# Patient Record
Sex: MALE | URBAN - METROPOLITAN AREA
[De-identification: names, ages, dates, MRNs, and addresses within clinical notes are randomized per-mention and may not be internally consistent; named-entity substitution may affect disease eponyms.]

---

## 2023-05-09 ENCOUNTER — INPATIENT (INPATIENT)
Facility: HOSPITAL | Age: 48
LOS: 0 days | Discharge: HOME | DRG: 55 | End: 2023-05-10
Attending: SURGERY | Admitting: SURGERY
Payer: MEDICAID

## 2023-05-09 VITALS
OXYGEN SATURATION: 97 % | HEART RATE: 70 BPM | SYSTOLIC BLOOD PRESSURE: 134 MMHG | DIASTOLIC BLOOD PRESSURE: 87 MMHG | RESPIRATION RATE: 15 BRPM

## 2023-05-09 DIAGNOSIS — S09.90XA UNSPECIFIED INJURY OF HEAD, INITIAL ENCOUNTER: ICD-10-CM

## 2023-05-09 LAB
ABO RH CONFIRMATION: SIGNIFICANT CHANGE UP
ALBUMIN SERPL ELPH-MCNC: 4.2 G/DL — SIGNIFICANT CHANGE UP (ref 3.3–5)
ALP SERPL-CCNC: 106 U/L — SIGNIFICANT CHANGE UP (ref 40–120)
ALT FLD-CCNC: 26 U/L — SIGNIFICANT CHANGE UP (ref 12–78)
ANION GAP SERPL CALC-SCNC: 10 MMOL/L — SIGNIFICANT CHANGE UP (ref 5–17)
ANISOCYTOSIS BLD QL: SLIGHT — SIGNIFICANT CHANGE UP
APTT BLD: 27.7 SEC — SIGNIFICANT CHANGE UP (ref 27.5–35.5)
AST SERPL-CCNC: 26 U/L — SIGNIFICANT CHANGE UP (ref 15–37)
BASOPHILS # BLD AUTO: 0.11 K/UL — SIGNIFICANT CHANGE UP (ref 0–0.2)
BASOPHILS NFR BLD AUTO: 0.9 % — SIGNIFICANT CHANGE UP (ref 0–2)
BILIRUB SERPL-MCNC: 0.5 MG/DL — SIGNIFICANT CHANGE UP (ref 0.2–1.2)
BLD GP AB SCN SERPL QL: SIGNIFICANT CHANGE UP
BUN SERPL-MCNC: 16 MG/DL — SIGNIFICANT CHANGE UP (ref 7–23)
CALCIUM SERPL-MCNC: 8.9 MG/DL — SIGNIFICANT CHANGE UP (ref 8.5–10.1)
CHLORIDE SERPL-SCNC: 111 MMOL/L — HIGH (ref 96–108)
CO2 SERPL-SCNC: 20 MMOL/L — LOW (ref 22–31)
CREAT SERPL-MCNC: 1.36 MG/DL — HIGH (ref 0.5–1.3)
EGFR: 65 ML/MIN/1.73M2 — SIGNIFICANT CHANGE UP
EOSINOPHIL # BLD AUTO: 0.18 K/UL — SIGNIFICANT CHANGE UP (ref 0–0.5)
EOSINOPHIL NFR BLD AUTO: 1.4 % — SIGNIFICANT CHANGE UP (ref 0–6)
ETHANOL SERPL-MCNC: <10 MG/DL — SIGNIFICANT CHANGE UP (ref 0–10)
GLUCOSE SERPL-MCNC: 172 MG/DL — HIGH (ref 70–99)
HCT VFR BLD CALC: 40.8 % — SIGNIFICANT CHANGE UP (ref 39–50)
HGB BLD-MCNC: 12.9 G/DL — LOW (ref 13–17)
IMM GRANULOCYTES NFR BLD AUTO: 1.8 % — HIGH (ref 0–0.9)
INR BLD: 1.08 RATIO — SIGNIFICANT CHANGE UP (ref 0.88–1.16)
LACTATE SERPL-SCNC: 1.3 MMOL/L — SIGNIFICANT CHANGE UP (ref 0.7–2)
LYMPHOCYTES # BLD AUTO: 2.94 K/UL — SIGNIFICANT CHANGE UP (ref 1–3.3)
LYMPHOCYTES # BLD AUTO: 22.7 % — SIGNIFICANT CHANGE UP (ref 13–44)
MANUAL SMEAR VERIFICATION: SIGNIFICANT CHANGE UP
MCHC RBC-ENTMCNC: 21.5 PG — LOW (ref 27–34)
MCHC RBC-ENTMCNC: 31.6 GM/DL — LOW (ref 32–36)
MCV RBC AUTO: 68 FL — LOW (ref 80–100)
MICROCYTES BLD QL: SLIGHT — SIGNIFICANT CHANGE UP
MONOCYTES # BLD AUTO: 1 K/UL — HIGH (ref 0–0.9)
MONOCYTES NFR BLD AUTO: 7.7 % — SIGNIFICANT CHANGE UP (ref 2–14)
NEUTROPHILS # BLD AUTO: 8.47 K/UL — HIGH (ref 1.8–7.4)
NEUTROPHILS NFR BLD AUTO: 65.5 % — SIGNIFICANT CHANGE UP (ref 43–77)
PLAT MORPH BLD: NORMAL — SIGNIFICANT CHANGE UP
PLATELET # BLD AUTO: 276 K/UL — SIGNIFICANT CHANGE UP (ref 150–400)
PLATELET COUNT - ESTIMATE: NORMAL — SIGNIFICANT CHANGE UP
POTASSIUM SERPL-MCNC: 4.1 MMOL/L — SIGNIFICANT CHANGE UP (ref 3.5–5.3)
POTASSIUM SERPL-SCNC: 4.1 MMOL/L — SIGNIFICANT CHANGE UP (ref 3.5–5.3)
PROT SERPL-MCNC: 7.6 GM/DL — SIGNIFICANT CHANGE UP (ref 6–8.3)
PROTHROM AB SERPL-ACNC: 12.5 SEC — SIGNIFICANT CHANGE UP (ref 10.5–13.4)
RBC # BLD: 6 M/UL — HIGH (ref 4.2–5.8)
RBC # FLD: 15.9 % — HIGH (ref 10.3–14.5)
RBC BLD AUTO: ABNORMAL
SODIUM SERPL-SCNC: 141 MMOL/L — SIGNIFICANT CHANGE UP (ref 135–145)
TROPONIN I, HIGH SENSITIVITY RESULT: 16.54 NG/L — SIGNIFICANT CHANGE UP
TROPONIN I, HIGH SENSITIVITY RESULT: 5.55 NG/L — SIGNIFICANT CHANGE UP
WBC # BLD: 12.93 K/UL — HIGH (ref 3.8–10.5)
WBC # FLD AUTO: 12.93 K/UL — HIGH (ref 3.8–10.5)

## 2023-05-09 PROCEDURE — 84100 ASSAY OF PHOSPHORUS: CPT

## 2023-05-09 PROCEDURE — 72170 X-RAY EXAM OF PELVIS: CPT | Mod: 26

## 2023-05-09 PROCEDURE — 84484 ASSAY OF TROPONIN QUANT: CPT

## 2023-05-09 PROCEDURE — 85730 THROMBOPLASTIN TIME PARTIAL: CPT

## 2023-05-09 PROCEDURE — 70496 CT ANGIOGRAPHY HEAD: CPT | Mod: 26,MA

## 2023-05-09 PROCEDURE — 99222 1ST HOSP IP/OBS MODERATE 55: CPT

## 2023-05-09 PROCEDURE — 85025 COMPLETE CBC W/AUTO DIFF WBC: CPT

## 2023-05-09 PROCEDURE — 93005 ELECTROCARDIOGRAM TRACING: CPT

## 2023-05-09 PROCEDURE — 74177 CT ABD & PELVIS W/CONTRAST: CPT | Mod: 26,MA

## 2023-05-09 PROCEDURE — 70450 CT HEAD/BRAIN W/O DYE: CPT

## 2023-05-09 PROCEDURE — 72125 CT NECK SPINE W/O DYE: CPT | Mod: 26,MA

## 2023-05-09 PROCEDURE — 36415 COLL VENOUS BLD VENIPUNCTURE: CPT

## 2023-05-09 PROCEDURE — 85610 PROTHROMBIN TIME: CPT

## 2023-05-09 PROCEDURE — 71045 X-RAY EXAM CHEST 1 VIEW: CPT | Mod: 26

## 2023-05-09 PROCEDURE — 83735 ASSAY OF MAGNESIUM: CPT

## 2023-05-09 PROCEDURE — 83605 ASSAY OF LACTIC ACID: CPT

## 2023-05-09 PROCEDURE — 80048 BASIC METABOLIC PNL TOTAL CA: CPT

## 2023-05-09 PROCEDURE — 71260 CT THORAX DX C+: CPT | Mod: 26,MA

## 2023-05-09 PROCEDURE — 93010 ELECTROCARDIOGRAM REPORT: CPT

## 2023-05-09 PROCEDURE — 81001 URINALYSIS AUTO W/SCOPE: CPT

## 2023-05-09 PROCEDURE — 99285 EMERGENCY DEPT VISIT HI MDM: CPT

## 2023-05-09 PROCEDURE — 70498 CT ANGIOGRAPHY NECK: CPT | Mod: 26,MA

## 2023-05-09 PROCEDURE — 95816 EEG AWAKE AND DROWSY: CPT

## 2023-05-09 RX ORDER — MORPHINE SULFATE 50 MG/1
2 CAPSULE, EXTENDED RELEASE ORAL EVERY 4 HOURS
Refills: 0 | Status: DISCONTINUED | OUTPATIENT
Start: 2023-05-09 | End: 2023-05-10

## 2023-05-09 RX ORDER — SODIUM CHLORIDE 9 MG/ML
250 INJECTION INTRAMUSCULAR; INTRAVENOUS; SUBCUTANEOUS ONCE
Refills: 0 | Status: COMPLETED | OUTPATIENT
Start: 2023-05-09 | End: 2023-05-09

## 2023-05-09 RX ORDER — SODIUM CHLORIDE 9 MG/ML
1000 INJECTION INTRAMUSCULAR; INTRAVENOUS; SUBCUTANEOUS
Refills: 0 | Status: DISCONTINUED | OUTPATIENT
Start: 2023-05-09 | End: 2023-05-10

## 2023-05-09 RX ORDER — ONDANSETRON 8 MG/1
4 TABLET, FILM COATED ORAL EVERY 6 HOURS
Refills: 0 | Status: DISCONTINUED | OUTPATIENT
Start: 2023-05-09 | End: 2023-05-10

## 2023-05-09 RX ORDER — HYDROMORPHONE HYDROCHLORIDE 2 MG/ML
0.5 INJECTION INTRAMUSCULAR; INTRAVENOUS; SUBCUTANEOUS EVERY 4 HOURS
Refills: 0 | Status: DISCONTINUED | OUTPATIENT
Start: 2023-05-09 | End: 2023-05-10

## 2023-05-09 RX ADMIN — MORPHINE SULFATE 2 MILLIGRAM(S): 50 CAPSULE, EXTENDED RELEASE ORAL at 22:38

## 2023-05-09 RX ADMIN — SODIUM CHLORIDE 250 MILLILITER(S): 9 INJECTION INTRAMUSCULAR; INTRAVENOUS; SUBCUTANEOUS at 22:31

## 2023-05-09 RX ADMIN — ONDANSETRON 4 MILLIGRAM(S): 8 TABLET, FILM COATED ORAL at 22:38

## 2023-05-09 NOTE — ED ADULT NURSE NOTE - OBJECTIVE STATEMENT
pt presents to ed as code trauma after being found down outside of work unresponsive. pt having left side weakness. bp normotensive, pt protecting airway, gcs 12-13, 2 PIV placed and taken to ct

## 2023-05-09 NOTE — H&P ADULT - HISTORY OF PRESENT ILLNESS
Trauma Activation: 18:37    HPI: 47 year old male with unknown PMH  found down by his work truck with a abrasion to the left parietal area with a subgaleal hematoma. Pt arousable to voice states name, states he is in the hospital. Initially on arrival he did not move his left side. After CT he was moving all extremities R>L.       Primary Survey  A - airway intact  B - bilateral breath sounds and good chest rise  C - initially BP: 134/87 (05-09-23 @ 19:09), palpable pulses in all extremities  D - GCS 13 on arrival  Exposure obtained      Secondary survey  General: Well developed, in no acute distress.   HEENT: NC/AT, PERRLA EOMI  Chest: Lungs clear, no rales, no rhonchi, no wheezes.   Heart: RR, no murmurs, no rubs, no gallops.   Abdomen: Soft, no tenderness, no masses, BS normal.    Back: Normal curvature, no tenderness.   Neuro: Physiological, no localizing findings.   Skin: Normal, Abrasion noted over left scapula  Extremities: Warm, well perfused, no edema, Distal Pulses intact; 5/5 muscle strength on RUE/RLE; 4/5 LLE; 1/5 LUE      PMH:   Denies    PSH:  Denies    MEDS:   Denies    Allergies  No Known Allergies    Social  Unknown    Labs:                        12.9   12.93 )-----------( 276      ( 09 May 2023 18:42 )             40.8     05-09    141  |  111<H>  |  16  ----------------------------<  172<H>  4.1   |  20<L>  |  1.36<H>    Ca    8.9      09 May 2023 18:42    TPro  7.6  /  Alb  4.2  /  TBili  0.5  /  DBili  x   /  AST  26  /  ALT  26  /  AlkPhos  106  05-09    PT/INR - ( 09 May 2023 18:42 )   PT: 12.5 sec;   INR: 1.08 ratio         PTT - ( 09 May 2023 18:42 )  PTT:27.7 sec          Imaging:    < from: CT Abdomen and Pelvis w/ IV Cont (05.09.23 @ 19:13) >    ACC: [id number] EXAM:  CT ABDOMEN AND PELVIS IC   ORDERED BY: MORAIMA ESPINAL     ACC: [id number] EXAM:  CT CHEST IC   ORDERED BY: MORAIMA KYLIE     PROCEDURE DATE:  05/09/2023          INTERPRETATION:  CLINICAL INFORMATION: Found unresponsive. Fall    COMPARISON: None.    CONTRAST/COMPLICATIONS:  IV Contrast: Omnipaque 350   90 cc administered   10 cc discarded  Oral Contrast: NONE  Complications: None reported at time of study completion    PROCEDURE:  CT of the Chest, Abdomen and Pelvis was performed.  Imaging was performed through the chest in the arterial phase followed by   imaging of the abdomen and pelvis in the portal venous phase.  Sagittal and coronal reformats were performed.    FINDINGS:  CHEST:  LUNGS AND LARGE AIRWAYS: Patent central airways. 3 mm subpleural nodule   in the right middle lobe on image 55 of series 2 and additional tiny   nodule anteriorly on image 45 in the right upper lobe with that of tiny   nodule on image 31 in the right upper lobe  PLEURA: No pleural effusion.  VESSELS: Minimal atherosclerotic calcification of the aorta and coronary   arteries  HEART: Heart size is normal. No pericardial effusion.  MEDIASTINUM AND ELAYNE: No lymphadenopathy.  CHEST WALL AND LOWER NECK: Within normal limits.    ABDOMEN AND PELVIS:  LIVER: Within normal limits.  BILE DUCTS: Normal caliber.  GALLBLADDER: Within normal limits.  SPLEEN: Within normal limits.  PANCREAS: Within normal limits.  ADRENALS: Within normal limits.  KIDNEYS/URETERS: Within normal limits.    BLADDER: Within normal limits.  REPRODUCTIVE ORGANS: Prostate within normal limits.    BOWEL: No bowel obstruction. Appendix is not visualized. No evidence of   inflammation in the pericecal region.  PERITONEUM: No ascites.  VESSELS: Atherosclerotic changes.  RETROPERITONEUM/LYMPH NODES: No lymphadenopathy.  ABDOMINAL WALL: Within normal limits.  BONES: Within normal limits.    IMPRESSION: No evidence of acute traumatic injury  Atherosclerotic vascular disease more than expected for the stated age    Tiny right upper lobe pulmonary nodules are nonspecific and may be   followed with CT chest in 12 months in a high risk patient    --- End of Report ---

## 2023-05-09 NOTE — ED PROVIDER NOTE - NS_ ATTENDINGSCRIBEDETAILS _ED_A_ED_FT
I Luiz Shaver MD saw and examined the patient. Scribe documented for me and under my supervision. I have modified the scribe's documentation where necessary to reflect my history, physical exam and other relevant documentations pertinent to the care of the patient.

## 2023-05-09 NOTE — PHARMACOTHERAPY INTERVENTION NOTE - COMMENTS
Medication reconciliation completed.  Reviewed Medication list and confirmed med allergies with patient; confirmed with Dr. First Medbhavana.

## 2023-05-09 NOTE — ED PROVIDER NOTE - NEUROLOGICAL, MLM
AAOx0, no focal deficits, no motor or sensory deficits other than as per EMS left sided chronic findings. CNs 2-12 grossly seem intact. No nuchal rigidity.

## 2023-05-09 NOTE — ED PROVIDER NOTE - PRINCIPAL DIAGNOSIS
Certification for Inpatient


Patient admitted to: Observation


With expected LOS: <2 Midnights


Patient will require the following post-hospital care: None


Practitioner: I am a practitioner with admitting privileges, knowledge of 

patient current condition, hospital course, and medical plan of care.


Services: Services provided to patient in accordance with Admission requirements

found in Title 42 Section 412.3 of the Code of Federal Regulations





<Manas Rodriguez - Last Filed: 12/01/20 01:36>





Patient History


Date of Service: 12/01/20


Primary Care Provider: Out of town


Reason for admission: Diplopia


History of Present Illness: 





70-year-old  male with history of hyperlipidemia, hypertension, chronic

sinus issues presents emergency department for diplopia.  Patient reports that 

on Sunday he is driving from [place] with his daughter when he began to see 

double.  Patient reports that he applied an eye patch to his left eye and that 

this did resolve the issue.  Patient reports that if he independently covered 

either his eyes his vision was clear but when looking to both eyes at the same 

time  he did have diplopia.  Patient reports this has continued noticing a few 

times throughout the day yesterday that he is having diplopia.  Patient was not 

having any associated symptoms including headache, unilateral weakness, slurred 

speech, difficulty thinking.  Patient reports a similar episode approximately 3 

years ago when he was worked up for TIA/CVA, patient was given prism lenses and 

this resolved his diplopia at that time.  Patient was worked up in the emergency

department, labs remarkable for potassium 3.4, GFR 59, creatinine 1.23, BUN 20, 

glucose 170 CT head in the emergency department was negative for any acute 

findings.  Patient reports in 2017 he had an MRI which showed a pituitary growth

but no intervention was recommended at that time.  ED provider discuss case with

neurology who recommends observation for further evaluation and management.





When I saw the patient in the ER he was awake, alert, oriented x3.  Patient with

no focal neurological deficits noted.  Patient without diplopia on exam.  Given 

aspirin, folic acid, statin, MRI ordered for morning.   





- Past Medical/Surgical History


Diabetic: No


-: Hypertension


-: Hyperlipidemia


-: Chronic sinus issues


-: Hernia repair x2


-: Right knee surgery 


-: Multiple sinus surgeries


-: Cataract surgery


Psychosocial/ Personal History: Patient is a 





- Family History


Family History: Reviewed- Non-Contributory





- Social History


Smoking Status: Never smoker


Alcohol use: Yes


CD- Drugs: No


Caffeine use: Yes


Place of Residence: Home





<Manas Rodriguez - Last Filed: 12/01/20 01:36>


Date of Service: 12/01/20





<bri altman - Last Filed: 12/01/20 10:54>


Allergies





cefixime [From Suprax] Allergy (Verified 12/01/20 01:50)


   Itching/Hives/Rash








Review of Systems


10-point ROS is otherwise unremarkable


Neurological: Other (Diplopia), As per HPI





<MarilynManas nagy - Last Filed: 12/01/20 01:36>





Physical Examination





- Physical Exam


General: Alert, In no apparent distress


HEENT: Atraumatic, PERRLA, Mucous membr. moist/pink, EOMI, Sclerae nonicteric


Neck: Supple, 2+ carotid pulse no bruit, No LAD, Without JVD or thyroid 

abnormality


Respiratory: Clear to auscultation bilaterally, Normal air movement


Cardiovascular: Regular rate/rhythm, Normal S1 S2


Gastrointestinal: Normal bowel sounds, No tenderness


Musculoskeletal: No tenderness


Integumentary: No rashes


Neurological: Normal gait, Normal speech, Normal strength at 5/5 x4 extr, Normal

tone, Sensation intact, Cranial nerves 3-12 intact, Normal affect


Lymphatics: No axilla or inguinal lymphadenopathy





- Studies


Laboratory Data (last 24 hrs)





11/30/20 23:24: Sodium 144, Potassium 3.4 L, BUN 20 H, Creatinine 1.23, Glucose 

170 H, Total Bilirubin 0.4, AST 19, ALT 35, Alkaline Phosphatase 66


11/30/20 23:24: WBC 8.1, Hgb 14.3, Hct 41.8, Plt Count 205








<JenniferManas - Last Filed: 12/01/20 01:36>





- Studies


Laboratory Data (last 24 hrs)





11/30/20 23:24: Sodium 144, Potassium 3.4 L, BUN 20 H, Creatinine 1.23, Glucose 

170 H, Total Bilirubin 0.4, AST 19, ALT 35, Alkaline Phosphatase 66


11/30/20 23:24: WBC 8.1, Hgb 14.3, Hct 41.8, Plt Count 205








<bri altman - Last Filed: 12/01/20 10:54>





Assessment and Plan





- Plan


Assessment


Diplopia


Hypertension


Hyperlipidemia


Hyperglycemia





Plan


Diplopia:  Resolved at this time.  Neurology consult in place.  Continue with 

aspirin, folic acid, high intensity statin.  MRI ordered for morning.  Lipid 

panel also ordered for morning labs.


Hypertension:  Obtain and continue home medications.  Blood pressure under 

control this time.


Hyperlipidemia:  Continue with high intensity statin.


Hyperglycemia:  Blood sugar 170 in ED patient without history of diabetes.  

Obtain A1c with morning labs.


Discharge Plan: Home


Plan to discharge in: 24 Hours





- Advance Directives


Does patient have a Living Will: No


Does patient have a Durable POA for Healthcare: No





- Code Status/Comfort Care


Code Status Assessed: Yes (Full code)


Critical Care: No


Time Spent Managing Pts Care (In Minutes): 55





<Manas Rodriguez - Last Filed: 12/01/20 01:36>


Physician Review: Patient Assessed, Agree with Above Assessment and Plan


Physician Review Additional Text: 


Diplopia.  Patient relates his diplopia to history of strabismus which was 

corrected using prism lense.  He was not using the prism lense after his 

cataract surgery.





Plan:


MRI of the brain.


Aspirin.


Lipid profile shows hypertriglyceridemia.


Statin.


Low-fat diet recommended.











<bri altman - Last Filed: 12/01/20 10:54>
Closed head injury

## 2023-05-09 NOTE — ED PROVIDER NOTE - CLINICAL SUMMARY MEDICAL DECISION MAKING FREE TEXT BOX
closed head injury, unclear circumstances, r/o ICH, CVA, CT findings noted, spoke with trauma, trauma to admit. No tPA indicated as CT contraindication.

## 2023-05-09 NOTE — ED PROVIDER NOTE - OBJECTIVE STATEMENT
47 year old male presents to the ED BIBA found unresponsive face down outside work vehicle, fall from standing height, pt confused now. Per EMS, stated possible weakness to left side. 47 year old male presents to the ED BIBA found unresponsive face down outside work vehicle, fall from standing height, pt confused now. Per EMS, stated possible weakness to left side. Code stroke and Code trauma was called upon presentation. Hx limited at the time of presentation.

## 2023-05-09 NOTE — H&P ADULT - NSHPLABSRESULTS_GEN_ALL_CORE
CT HEAD:    Acute multifocal subarachnoid bleed identified in the left sylvian   fissure extending into the left frontal sulci, inferior interhemispheric   fissure, and right parietal sulci. No significant local mass effect.    There is a vascular stent identified in the distal cervical right ICA   extending into the vertical and horizontal petrous segments and second   intracranial right ICA stent in the clinoid/supraclinoid segment   extending into the ICA terminus.    Right temporal encephalomalacia and gliosis involving the insula and   operculum, as well as encephalomalacia in the right frontoparietal   regions to a lesser extent due to chronic right middle cerebral artery   (MCA) territory infarction. Suggestion of trace hyperattenuation along   the lateral margin of the infarction bed reflect additional site of   bleeding (9-32). Ex vacuo dilatation of the right lateral ventricle with   associated ipsilateral rightward shift of the septum pellucidum measuring   5 mm. No hydrocephalus.    Moderate left and mild right maxillary sinus mucosal thickening with near   complete opacification of the ethmoid sinuses bilaterally. Left frontal   sinus polyp or retention cyst. The mastoid air cells are clear.   Pneumatized petrous apices bilaterally. Calvarium is intact.    CTA BRAIN    INTERNAL CAROTID ARTERIES: Patent clinoid/supraclinoid ICA terminus stent   with contrast flow within and distal to the stent extending into the   right MCA. Additional device to the right of the right ICA terminus stent   is noted, possibly related to coil embolization.  Scattered calcified plaque along the left carotid siphon. No   flow-limiting stenosis or occlusion.    ANTERIOR CEREBRAL ARTERIES: Patent without flow-limiting stenosis or   occlusion. Anterior communicating artery is unremarkable without aneurysm.    MIDDLE CEREBRAL ARTERIES: Patent without flow-limiting stenosis or   occlusion. MCA bifurcations are unremarkable without aneurysm.    POSTERIOR CEREBRAL ARTERIES: Patent without flow-limiting stenosis or   occlusion. The left posterior communicating artery is diminutive, but   patent. There is a probable infundibulum at the origin of the right   posterior communicating artery. The right posterior communicating artery   is not well seen.    VERTEBROBASILAR SYSTEM: Patent without flow-limiting stenosis or   occlusion.    CTA NECK    RIGHT CAROTID SYSTEM: Patentthree tandem ICA stents extending from the   ICA origin to the level of the horizontal petrous segment via a tortuous   hairpin turn in the distal cervical ICA. Normal contrast opacification   within and distal to the stent intracranially. Patent right CCA.    LEFT CAROTID SYSTEM: Normal in caliber without flow-limiting stenosis or   occlusion. Tortuous distal cervical ICA segment with hairpin loop.    VERTEBRAL SYSTEM:  Normal in course and caliber without flow-limiting   stenosis or occlusion. Origin of the vertebral arteries are unremarkable.    AORTIC ARCH: Origin of the great vessels are unremarkable.    IMPRESSION:    CT HEAD: Acute multifocal subarachnoid bleed in the left Sylvian fissure   extending into the left frontal sulci, inferior interhemispheric fissure,   and right parietal sulci. No associated mass effect.    Chronic right MCA territory infarction resulting in volume loss and 5 mm   rightward ipsilateral shift.    Tandem distal cervical and clinoid/supraclinoid ICA stents.    CTA BRAIN: Patent right clinoid/supraclinoid ICA stent extending into the   ICA terminus. Adjacent metallic device extending posterior to the ICA   terminus and proximal M1 segment of the right MCA may be related to   aneurysm coil embolization.Correlate with surgical history.    Left posterior communicating artery infundibulum.    CTA NECK: Patent three tandem right cervical ICA stents extending from   the ICA origin to the level of the horizontal petrous segment via a   tortuous hairpin turn in the distal cervical ICA.    --- End of Report ---

## 2023-05-09 NOTE — H&P ADULT - ASSESSMENT
47yoM presenting as a trauam BIBEMS after found down    Plan:  Imaging reviewed: acute L. SAH and chronic R. MCA stroke with volume loss  Neurosurgery reccs appreciated  Repeat CT scan in 6hours  Patient is responsive but history is reliable  Denies medications or blood thinners but is noted to be taking Brillanta/ASA/Keppra/Amlodopine according to "Dr. Orta"  Unknwon baseline mental status nor motor function  Will Admit to Surgical Step down for q2hour neurochecks  Serial neurological exams  Pain control PRN  ADAT  GI ppx  Hold Chemical DVT ppx  Venodynes    Plan discussed with Dr. Angulo

## 2023-05-09 NOTE — ED PROVIDER NOTE - DIFFERENTIAL DIAGNOSIS
Differential Diagnosis closed head injury, unclear circumstances, r/o ICH, CVA, CT findings noted, spoke with trauma, trauma to admit. No tPA indicated as CT contraindication.

## 2023-05-09 NOTE — ED ADULT TRIAGE NOTE - CHIEF COMPLAINT QUOTE
BIBA found unresponsive face down outside work vehicle. per EMS fall from standing, head abrasion and per EMS complete left side paralysis

## 2023-05-09 NOTE — CONSULT NOTE ADULT - ASSESSMENT
47 year old male code trauma found down with SAH   47 year old male code trauma found down with multifocal SAH    No acute neurosurgical intervention at this time  Repeat CT in 6 hours or if any changes in mental status  Remainder of care and management per trauma/primary team  d/w Dr Monaco

## 2023-05-10 VITALS — TEMPERATURE: 97 F

## 2023-05-10 LAB
ANION GAP SERPL CALC-SCNC: 9 MMOL/L — SIGNIFICANT CHANGE UP (ref 5–17)
APPEARANCE UR: CLEAR — SIGNIFICANT CHANGE UP
APTT BLD: 32.5 SEC — SIGNIFICANT CHANGE UP (ref 27.5–35.5)
BASOPHILS # BLD AUTO: 0.06 K/UL — SIGNIFICANT CHANGE UP (ref 0–0.2)
BASOPHILS NFR BLD AUTO: 0.4 % — SIGNIFICANT CHANGE UP (ref 0–2)
BILIRUB UR-MCNC: NEGATIVE — SIGNIFICANT CHANGE UP
BUN SERPL-MCNC: 12 MG/DL — SIGNIFICANT CHANGE UP (ref 7–23)
CALCIUM SERPL-MCNC: 8.8 MG/DL — SIGNIFICANT CHANGE UP (ref 8.5–10.1)
CHLORIDE SERPL-SCNC: 114 MMOL/L — HIGH (ref 96–108)
CO2 SERPL-SCNC: 21 MMOL/L — LOW (ref 22–31)
COLOR SPEC: YELLOW — SIGNIFICANT CHANGE UP
CREAT SERPL-MCNC: 0.76 MG/DL — SIGNIFICANT CHANGE UP (ref 0.5–1.3)
DIFF PNL FLD: ABNORMAL
EGFR: 113 ML/MIN/1.73M2 — SIGNIFICANT CHANGE UP
EOSINOPHIL # BLD AUTO: 0.02 K/UL — SIGNIFICANT CHANGE UP (ref 0–0.5)
EOSINOPHIL NFR BLD AUTO: 0.1 % — SIGNIFICANT CHANGE UP (ref 0–6)
GLUCOSE SERPL-MCNC: 95 MG/DL — SIGNIFICANT CHANGE UP (ref 70–99)
GLUCOSE UR QL: NEGATIVE — SIGNIFICANT CHANGE UP
HCT VFR BLD CALC: 41.2 % — SIGNIFICANT CHANGE UP (ref 39–50)
HGB BLD-MCNC: 13 G/DL — SIGNIFICANT CHANGE UP (ref 13–17)
IMM GRANULOCYTES NFR BLD AUTO: 0.4 % — SIGNIFICANT CHANGE UP (ref 0–0.9)
INR BLD: 1.14 RATIO — SIGNIFICANT CHANGE UP (ref 0.88–1.16)
KETONES UR-MCNC: ABNORMAL
LEUKOCYTE ESTERASE UR-ACNC: NEGATIVE — SIGNIFICANT CHANGE UP
LYMPHOCYTES # BLD AUTO: 1.65 K/UL — SIGNIFICANT CHANGE UP (ref 1–3.3)
LYMPHOCYTES # BLD AUTO: 10.6 % — LOW (ref 13–44)
MAGNESIUM SERPL-MCNC: 2.4 MG/DL — SIGNIFICANT CHANGE UP (ref 1.6–2.6)
MCHC RBC-ENTMCNC: 21.6 PG — LOW (ref 27–34)
MCHC RBC-ENTMCNC: 31.6 GM/DL — LOW (ref 32–36)
MCV RBC AUTO: 68.3 FL — LOW (ref 80–100)
MONOCYTES # BLD AUTO: 1.16 K/UL — HIGH (ref 0–0.9)
MONOCYTES NFR BLD AUTO: 7.4 % — SIGNIFICANT CHANGE UP (ref 2–14)
NEUTROPHILS # BLD AUTO: 12.65 K/UL — HIGH (ref 1.8–7.4)
NEUTROPHILS NFR BLD AUTO: 81.1 % — HIGH (ref 43–77)
NITRITE UR-MCNC: NEGATIVE — SIGNIFICANT CHANGE UP
PH UR: 5 — SIGNIFICANT CHANGE UP (ref 5–8)
PHOSPHATE SERPL-MCNC: 3.8 MG/DL — SIGNIFICANT CHANGE UP (ref 2.5–4.5)
PLATELET # BLD AUTO: 237 K/UL — SIGNIFICANT CHANGE UP (ref 150–400)
POTASSIUM SERPL-MCNC: 3.9 MMOL/L — SIGNIFICANT CHANGE UP (ref 3.5–5.3)
POTASSIUM SERPL-SCNC: 3.9 MMOL/L — SIGNIFICANT CHANGE UP (ref 3.5–5.3)
PROT UR-MCNC: SIGNIFICANT CHANGE UP MG/DL
PROTHROM AB SERPL-ACNC: 13.3 SEC — SIGNIFICANT CHANGE UP (ref 10.5–13.4)
RBC # BLD: 6.03 M/UL — HIGH (ref 4.2–5.8)
RBC # FLD: 17 % — HIGH (ref 10.3–14.5)
SODIUM SERPL-SCNC: 144 MMOL/L — SIGNIFICANT CHANGE UP (ref 135–145)
SP GR SPEC: 1.01 — SIGNIFICANT CHANGE UP (ref 1.01–1.02)
UROBILINOGEN FLD QL: NEGATIVE — SIGNIFICANT CHANGE UP
WBC # BLD: 15.61 K/UL — HIGH (ref 3.8–10.5)
WBC # FLD AUTO: 15.61 K/UL — HIGH (ref 3.8–10.5)

## 2023-05-10 PROCEDURE — 93010 ELECTROCARDIOGRAM REPORT: CPT

## 2023-05-10 PROCEDURE — 70450 CT HEAD/BRAIN W/O DYE: CPT | Mod: 26

## 2023-05-10 PROCEDURE — 95816 EEG AWAKE AND DROWSY: CPT | Mod: 26

## 2023-05-10 PROCEDURE — 99232 SBSQ HOSP IP/OBS MODERATE 35: CPT

## 2023-05-10 PROCEDURE — 99223 1ST HOSP IP/OBS HIGH 75: CPT

## 2023-05-10 RX ORDER — ACETAMINOPHEN 500 MG
650 TABLET ORAL EVERY 6 HOURS
Refills: 0 | Status: DISCONTINUED | OUTPATIENT
Start: 2023-05-10 | End: 2023-05-10

## 2023-05-10 RX ORDER — DIVALPROEX SODIUM 500 MG/1
500 TABLET, DELAYED RELEASE ORAL
Refills: 0 | Status: DISCONTINUED | OUTPATIENT
Start: 2023-05-10 | End: 2023-05-10

## 2023-05-10 RX ORDER — DIVALPROEX SODIUM 500 MG/1
1 TABLET, DELAYED RELEASE ORAL
Qty: 60 | Refills: 0
Start: 2023-05-10 | End: 2023-06-08

## 2023-05-10 RX ORDER — ACETAMINOPHEN 500 MG
2 TABLET ORAL
Qty: 0 | Refills: 0 | DISCHARGE
Start: 2023-05-10

## 2023-05-10 RX ORDER — DIVALPROEX SODIUM 500 MG/1
1 TABLET, DELAYED RELEASE ORAL
Refills: 0
Start: 2023-05-10

## 2023-05-10 RX ORDER — ACETAMINOPHEN 500 MG
0 TABLET ORAL
Qty: 30 | Refills: 0
Start: 2023-05-10

## 2023-05-10 RX ORDER — DIVALPROEX SODIUM 500 MG/1
1 TABLET, DELAYED RELEASE ORAL
Qty: 0 | Refills: 0 | DISCHARGE
Start: 2023-05-10

## 2023-05-10 RX ADMIN — Medication 650 MILLIGRAM(S): at 10:15

## 2023-05-10 RX ADMIN — DIVALPROEX SODIUM 500 MILLIGRAM(S): 500 TABLET, DELAYED RELEASE ORAL at 15:04

## 2023-05-10 RX ADMIN — SODIUM CHLORIDE 75 MILLILITER(S): 9 INJECTION INTRAMUSCULAR; INTRAVENOUS; SUBCUTANEOUS at 01:07

## 2023-05-10 RX ADMIN — Medication 650 MILLIGRAM(S): at 15:30

## 2023-05-10 RX ADMIN — Medication 650 MILLIGRAM(S): at 15:04

## 2023-05-10 RX ADMIN — Medication 650 MILLIGRAM(S): at 09:51

## 2023-05-10 NOTE — DISCHARGE NOTE PROVIDER - CARE PROVIDERS DIRECT ADDRESSES
[email or web address],DirectAddress_Unknown

## 2023-05-10 NOTE — CONSULT NOTE ADULT - ASSESSMENT
77 year old M with PMHx of stroke a year and a half ago and was treated at [facility], had a multiple stents/webs place in the right internal and intracerebral carotid artery, also had seizures after the stroke, was on ASA, Brilinta, Keppra, amlodipine, he stopped taking all he medication some time ago because he didn't like the way they made him feel, especially Keppra that made him feel nasty. Pt presented to ED on 5/10/22 as he was found down by his work truck with a abrasion to the left parietal area, Code trauma called, followed up by neurosurgery  CT head revealed acute multifocal subarachnoid bleed in the left Sylvian fissure extending into left frontal sulci, interhemispheric fissure and right parietal sulci.    Pt does not recall having had more seizures in the interim since his prior stroke, yesterday, he recalls going to work, he did not feel well, went out to possibly rest in his van, has no recollection as to what happened after that.     # Possible breakthrough seizure; not definite whether seizure came first or the SAH led to seizure    - Will get EEG  - Star Depakote 250 mg TID, plan to increase dose to 500 mg bid in 2-3 weeks  - Pt educated reg sz and compliance with AED    Above D/W Pt and MICHELLE Gutierrez

## 2023-05-10 NOTE — DISCHARGE NOTE PROVIDER - CARE PROVIDER_API CALL
Ewa Giron)  Neurology  [address] [facility], [address] [place] [address]  Phone: [phone]  Fax: [phone]  Follow Up Time: 2 weeks    Dr. Michelle in [place],   Phone: (   )    -  Fax: (   )    -  Follow Up Time: 1 week

## 2023-05-10 NOTE — DISCHARGE NOTE PROVIDER - NSDCMRMEDTOKEN_GEN_ALL_CORE_FT
acetaminophen 325 mg oral tablet: 2 tab(s) orally every 6 hours As needed Temp greater or equal to 38C (100.4F), Mild Pain (1 - 3)  divalproex sodium 500 mg oral delayed release tablet: 1 tab(s) orally 2 times a day

## 2023-05-10 NOTE — DISCHARGE NOTE PROVIDER - HOSPITAL COURSE
78 yo M with PMHx of CVA a year and a half ago and was treated at [facility], had a multiple stents/webs place in the right internal and intracerebral carotid artery, also had seizures after the stroke, was on ASA, Brilinta, Keppra, amlodipine, he stopped taking all he medication some time ago because he didn't like the way they made him feel, especially Keppra that made him feel nasty. Pt presented to ED on 5/10/22 as he was found down by his work truck with a abrasion to the left parietal area, Code trauma called, followed up by neurosurgery  CT head revealed acute multifocal subarachnoid bleed in the left Sylvian fissure extending into left frontal sulci, interhemispheric fissure and right parietal sulci. Pt was admitted to SICU for Neurochecks. Pt had repeat CT scan shows stable SAH and Neurology consulted, pt had EEG and recommended start Depakote 250 mg TID, plan to increase dose to 500 mg bid in 2-3 week. Pt is alert and wants to leave against medical advice. Pt is stable and will signout AMA. Will send rx for depakote to pharmacy

## 2023-05-10 NOTE — PROGRESS NOTE ADULT - ASSESSMENT
47 year old male code trauma found down with multifocal SAH    #SAH  #Seizure  #chronic strole   # s/p right MCA stent     PLAN-   No acute neurosurgical intervention   Continue to hold asa and Brilinta  Pt should follow up with Dr. Michelle at [facility] for a formal cerebral angiogram   Discussed with Dr. Ayon at [facility]- stents appear patent   Neurology consulted- Dr. Giron, EEG done, Dr. Giron to change antiseizure medication  Venodynes for DVT PPX   Tylenol as needed for headache.    Discussed with Dr. Monaco who agrees with plan   
46 yo M s/p trauma with SAH  doing well    Plan:  cont diet  neurochecks  Neurosurgery reccs appreciated, f/u EEG  Serial neurological exams  Pain control PRN  ADAT  GI ppx  Hold Chemical DVT ppx  Venodynes    Plan discussed with Dr. Angulo

## 2023-05-10 NOTE — DISCHARGE NOTE PROVIDER - PROVIDER TOKENS
PROVIDER:[TOKEN:[[id number]],FOLLOWUP:[2 weeks]],FREE:[LAST:[Dr. Michelle in [place]],PHONE:[(   )    -],FAX:[(   )    -],FOLLOWUP:[1 week]]

## 2023-05-10 NOTE — PATIENT PROFILE ADULT - STATED REASON FOR ADMISSION
pt stating he doesn't know why he is here, the only thing he remembers is being at work and not feeling well but cannot elaborate any more than that  (pt was found down on the ground at his job)

## 2023-05-10 NOTE — DISCHARGE NOTE NURSING/CASE MANAGEMENT/SOCIAL WORK - PATIENT PORTAL LINK FT
You can access the FollowMyHealth Patient Portal offered by [facility] by registering at the following website: [email or web address]. By joining [employer]s FollowMyHealth portal, you will also be able to view your health information using other applications (apps) compatible with our system.

## 2023-05-10 NOTE — PATIENT PROFILE ADULT - FALL HARM RISK - HARM RISK INTERVENTIONS

## 2023-05-10 NOTE — PROGRESS NOTE ADULT - SUBJECTIVE AND OBJECTIVE BOX
SURGERY DAILY PROGRESS NOTE:     Subjective:  Patient seen and examined at bedside during am rounds reports doing well. Pt states he is thirsty, complains of mild neck discomfort from the c-collar. Denies any fevers, chills, n/v/d, chest pain or shortness of breath    Objective:    MEDICATIONS  (STANDING):  sodium chloride 0.9%. 1000 milliLiter(s) (75 mL/Hr) IV Continuous <Continuous>    MEDICATIONS  (PRN):  acetaminophen     Tablet .. 650 milliGRAM(s) Oral every 6 hours PRN Temp greater or equal to 38C (100.4F), Mild Pain (1 - 3)  HYDROmorphone  Injectable 0.5 milliGRAM(s) IV Push every 4 hours PRN Severe Pain (7 - 10)  morphine  - Injectable 2 milliGRAM(s) IV Push every 4 hours PRN Moderate Pain (4 - 6)  ondansetron Injectable 4 milliGRAM(s) IV Push every 6 hours PRN Nausea      Vital Signs Last 24 Hrs  T(C): 36.4 (10 May 2023 04:56), Max: 36.4 (09 May 2023 19:30)  T(F): 97.5 (10 May 2023 04:56), Max: 97.6 (09 May 2023 19:30)  HR: 64 (10 May 2023 09:00) (61 - 70)  BP: 142/89 (10 May 2023 09:00) (134/87 - 150/80)  BP(mean): 101 (10 May 2023 09:00) (94 - 110)  RR: 14 (10 May 2023 09:00) (10 - 15)  SpO2: 98% (10 May 2023 08:00) (93% - 98%)    Parameters below as of 10 May 2023 08:00  Patient On (Oxygen Delivery Method): room air          PHYSICAL EXAM   GCS of 15  Airway is patent  Breathing is symmetric and unlabored  Neuro: CNII-XII grossly intact  Psych: normal affect  HEENT: Normocephalic, atraumatic, ALEJANDRA, EOM wnl, no otorrhea or hemotympanum b/l, no epistaxis or d/c b/l nares, no craniofacial bony pathology or tenderness b/l, c-collar in place, removed, placed in soft collar for comfort   Neck: Pt in hard cervical collar at time of exam. No crepitus, no ecchymosis, no hematoma, to exam, no JVD, no tracheal deviation  Cspine/thoracolumbrosacral spine: no gross bony pathology or tenderness to exam  Cardiovascular: S1S2 Present  Chest: no gross rib pathology or tenderness to exam. No sternal pathology or tenderness to exam. No crepitus, no ecchymosis, no hematoma. No penetrating thorcoabdominal trauma  Respiratory: Rate is 18; Respiratory Effort normal; no wheezes, rales or rhonchi to exam  ABD: bowel sounds (+), soft, nontender, non distended, no rebound, no guarding, no rigidity, no skin changes to exam. No pelvic instability to exam, no skin changes  Rectal: anal sphincter tone normal, guiac negative  Genitourinary: No scrotal/perineal/perirectal hematoma/ecchymosis/tenderness to exam  External genitalia: normal, no blood at urethral meatus  Musculoskeletal: Pt has palpable b/l radial, femoral, dorsalis pedis pulses. All digits are warm and well perfused. No gross long bone pathology or tenderness to exam. Pt demonstrates grossly intact sensoromotor function. Pt has good capillary refill to digits, no calf edema or tenderness to exam.  Skin: no lesions or rashes to exam    I&O's Detail      Daily     Daily Weight in k.4 (10 May 2023 04:56)    LABS:                        13.0   15.61 )-----------( 237      ( 10 May 2023 05:59 )             41.2     05-10    144  |  114<H>  |  12  ----------------------------<  95  3.9   |  21<L>  |  0.76    Ca    8.8      10 May 2023 05:59  Phos  3.8     05-10  Mg     2.4     05-10    TPro  7.6  /  Alb  4.2  /  TBili  0.5  /  DBili  x   /  AST  26  /  ALT  26  /  AlkPhos  106  05-09    PT/INR - ( 10 May 2023 05:59 )   PT: 13.3 sec;   INR: 1.14 ratio         PTT - ( 10 May 2023 05:59 )  PTT:32.5 sec  Urinalysis Basic - ( 10 May 2023 01:43 )    Color: Yellow / Appearance: Clear / S.010 / pH: x  Gluc: x / Ketone: Moderate  / Bili: Negative / Urobili: Negative   Blood: x / Protein: Trace mg/dL / Nitrite: Negative   Leuk Esterase: Negative / RBC: 0-2 /HPF / WBC Negative /HPF   Sq Epi: x / Non Sq Epi: x / Bacteria: Negative        
HPI: 77 year old male with unknown PMH  found down by his work truck with a abrasion to the left parietal area with a subgaleal hematoma. Pt arousable to voice states name, states he is in the hospital. Initially on arrival he did not move his left side. After CT he was moving all extremities R>L.   code trauma: 18:37 GCS: upon arrival 13    5/10- Pt seen and examined in the SDU, now know more about his medical history: he had a stroke a year and a half ago and was treated at [facility] where is had a multiple stents/webs place in the right internal and intracerebral carotid artery. He also started to have seizures after the stroke. As per pt's mother he was on ASA, Brilinta, Keppra, and amlodipine- the pt states he stopped taking all he medication some time ago because he didn't like the way they made him feel. Pt is much more awake and alert this morning- no events overnight, repeat CT head stable, pt states he really just wants to go home. Sr. Giron from neurology ws consulted and an EEG was done.     Vital Signs Last 24 Hrs  T(C): 36.7 (10 May 2023 09:36), Max: 36.7 (10 May 2023 09:36)  T(F): 98.1 (10 May 2023 09:36), Max: 98.1 (10 May 2023 09:36)  HR: 59 (10 May 2023 11:00) (59 - 74)  BP: 137/89 (10 May 2023 11:00) (134/87 - 150/80)  BP(mean): 100 (10 May 2023 11:00) (94 - 110)  RR: 12 (10 May 2023 11:00) (10 - 15)  SpO2: 98% (10 May 2023 08:00) (93% - 98%)    Parameters below as of 10 May 2023 08:00  Patient On (Oxygen Delivery Method): room air    MEDICATIONS  (STANDING):  sodium chloride 0.9%. 1000 milliLiter(s) (75 mL/Hr) IV Continuous     MEDICATIONS  (PRN):  acetaminophen     Tablet .. 650 milliGRAM(s) Oral every 6 hours PRN Temp greater or equal to 38C (100.4F), Mild Pain (1 - 3)  HYDROmorphone  Injectable 0.5 milliGRAM(s) IV Push every 4 hours PRN Severe Pain (7 - 10)  morphine  - Injectable 2 milliGRAM(s) IV Push every 4 hours PRN Moderate Pain (4 - 6)  ondansetron Injectable 4 milliGRAM(s) IV Push every 6 hours PRN Nausea    ROS: pertinent positives in HPI, all other ROS were reviewed and are negative     PHYSICAL EXAM:  Constitutional: Awake / alert, NAD, resting comfortably in bed  Eyes: anicteric  sclera moist conjunctivae PEARRLA  HENT: atraumatic, oropharynx clear with moist mucous memnranes  Neck: trachea midline, FROM, supple  Respiratory: Breath sounds are clear bilaterally, normal respiratory effort and no intercostal retractions   Cardiovascular: S1 and S2, regular rhythm  Gastrointestinal: Soft, NT/ND, +BS  Extremities:  no peripheral edema, no joint swelling/tenderness, no abnormal movements  Skin: No rashes  Psych: appropriate affect, alert, and orietned to person, place and time    Neurological Exam:  HF: A x O x 3, appropriately interactive, normal affect, speech fluent, no aphasia or paraphasic errors. Naming /repetition intact   CN: PERRL, EOMI, VFF, facial sensation normal, slight left NLFD (chronic)  tongue midline  Motor: No pronator drift, Strength 5/5 in all 4 ext except for mild weakness in left arm and leg (5-/5), normal bulk and tone, no tremor, rigidity or bradykinesia  Sens: Intact to light touch  Reflexes: Symmetric and normal, downgoing toes b/l  Coord:  No FNFA, dysmetria, DEEP intact   Gait/Balance: Cannot test    LABS:                         13.0   15.61 )-----------( 237      ( 10 May 2023 05:59 )             41.2     05-10    144  |  114<H>  |  12  ----------------------------<  95  3.9   |  21<L>  |  0.76    Ca    8.8      10 May 2023 05:59  Phos  3.8     05-10  Mg     2.4     05-10    TPro  7.6  /  Alb  4.2  /  TBili  0.5  /  DBili  x   /  AST  26  /  ALT  26  /  AlkPhos  106  05-09    LIVER FUNCTIONS - ( 09 May 2023 18:42 )  Alb: 4.2 g/dL / Pro: 7.6 gm/dL / ALK PHOS: 106 U/L / ALT: 26 U/L / AST: 26 U/L / GGT: x           RADIOLOGY:  < from: CT Head No Cont (05.10.23 @ 02:10) >  There is subarachnoid hemorrhage in the left sylvian fissure unchanged   since the prior exam. There is a stent in the M1 segment of the right   middle cerebral artery. There is an old right middle cerebral artery   infarct as well as a small low density subdural collection. There is ex   vacuo dilatation of theright lateral ventricle.    IMPRESSION: No change since 5/9/2023. Left sylvian fissure subarachnoid   hemorrhage. Right MCA stent and old right middle cerebral artery infarct.

## 2023-05-10 NOTE — DISCHARGE NOTE PROVIDER - NSDCCPCAREPLAN_GEN_ALL_CORE_FT
PRINCIPAL DISCHARGE DIAGNOSIS  Diagnosis: Closed head injury  Assessment and Plan of Treatment:       SECONDARY DISCHARGE DIAGNOSES  Diagnosis: Syncope  Assessment and Plan of Treatment:

## 2023-05-10 NOTE — CONSULT NOTE ADULT - SUBJECTIVE AND OBJECTIVE BOX
HPI: 77 year old male with unknown PMH  found down by his work truck with a abrasion to the left parietal area with a subgaleal hematoma. Pt arousable to voice states name, states he is in the hospital. Initially on arrival he did not move his left side. After CT he was moving all extremities R>L.   code trauma: 18:37  GCS: upon arrival 13          PAST MEDICAL & SURGICAL HISTORY:   stroke      Allergies    No Known Allergies    Intolerances        MEDICATIONS  (STANDING):  sodium chloride 0.9% Bolus 250 milliLiter(s) IV Bolus once    MEDICATIONS  (PRN):      SOCIAL HISTORY: unknown    FAMILY HISTORY:unknown      Vital Signs Last 24 Hrs  T(C): 36.4 (09 May 2023 19:30), Max: 36.4 (09 May 2023 19:30)  T(F): 97.6 (09 May 2023 19:30), Max: 97.6 (09 May 2023 19:30)  HR: 70 (09 May 2023 19:09) (70 - 70)  BP: 134/87 (09 May 2023 19:09) (134/87 - 134/87)  BP(mean): --  RR: 15 (09 May 2023 19:09) (15 - 15)  SpO2: 97% (09 May 2023 19:09) (97% - 97%)    Parameters below as of 09 May 2023 19:09  Patient On (Oxygen Delivery Method): room air        LABS:                        12.9   12.93 )-----------( 276      ( 09 May 2023 18:42 )             40.8     05-09    141  |  111<H>  |  16  ----------------------------<  172<H>  4.1   |  20<L>  |  1.36<H>    Ca    8.9      09 May 2023 18:42    TPro  7.6  /  Alb  4.2  /  TBili  0.5  /  DBili  x   /  AST  26  /  ALT  26  /  AlkPhos  106  05-09    PT/INR - ( 09 May 2023 18:42 )   PT: 12.5 sec;   INR: 1.08 ratio         PTT - ( 09 May 2023 18:42 )  PTT:27.7 sec      RADIOLOGY & ADDITIONAL STUDIES:  ~~~~~~~~~~~~~~~~~~~~~~~~~~~~~~    REVIEW OF SYSTEMS:   unable to obtain due to clinical condition        Constitutional: arousable to voice, opens eyes, states name, follows commands, states he is in the hospital  HEENT: L P abrasion,, no otorrhea, no rhinorrhea  Neck: collar in place  Respiratory: Breath sounds are clear bilaterally  Cardiovascular: S1 and S2, regular rhythm  Gastrointestinal: soft, nontender  Extremities:  no edema  Musculoskeletal: no joint swelling/tenderness, no abnormal movements  Skin: L pareietal abrasion    Neurological exam:  Open eyes to voice, states name, says he is in the hospital  and follows commands GCS:13   E3, V4, M6  Left parietal abrasion with small laceration, mild L facial asymmetry  Pupils reactive bilat, no nystagmus, tongue midline, speech clear  neck in cervical collar, no otorrhea no rhinorrhea  Motor: Moving all extremities well antigravity R >L   Sensation: appears intact  Gait/Balance: Cannot test      CT HEAD:    Acute multifocal subarachnoid bleed identified in the left sylvian   fissure extending into the left frontal sulci, inferior interhemispheric   fissure, and right parietal sulci. No significant local mass effect.    There is a vascular stent identified in the distal cervical right ICA   extending into the vertical and horizontal petrous segments and second   intracranial right ICA stent in the clinoid/supraclinoid segment   extending into the ICA terminus.    Right temporal encephalomalacia and gliosis involving the insula and   operculum, as well as encephalomalacia in the right frontoparietal   regions to a lesser extent due to chronic right middle cerebral artery   (MCA) territory infarction. Suggestion of trace hyperattenuation along   the lateral margin of the infarction bed reflect additional site of   bleeding (9-32). Ex vacuo dilatation of the right lateral ventricle with   associated ipsilateral rightward shift of the septum pellucidum measuring   5 mm. No hydrocephalus.    Moderate left and mild right maxillary sinus mucosal thickening with near   complete opacification of the ethmoid sinuses bilaterally. Left frontal   sinus polyp or retention cyst. The mastoid air cells are clear.   Pneumatized petrous apices bilaterally. Calvarium is intact.    CTA BRAIN    INTERNAL CAROTID ARTERIES: Patent clinoid/supraclinoid ICA terminus stent   with contrast flow within and distal to the stent extending into the   right MCA. Additional device to the right of the right ICA terminus stent   is noted, possibly related to coil embolization.  Scattered calcified plaque along the left carotid siphon. No   flow-limiting stenosis or occlusion.    ANTERIOR CEREBRAL ARTERIES: Patent without flow-limiting stenosis or   occlusion. Anterior communicating artery is unremarkable without aneurysm.    MIDDLE CEREBRAL ARTERIES: Patent without flow-limiting stenosis or   occlusion. MCA bifurcations are unremarkable without aneurysm.    POSTERIOR CEREBRAL ARTERIES: Patent without flow-limiting stenosis or   occlusion. The left posterior communicating artery is diminutive, but   patent. There is a probable infundibulum at the origin of the right   posterior communicating artery. The right posterior communicating artery   is not well seen.    VERTEBROBASILAR SYSTEM: Patent without flow-limiting stenosis or   occlusion.    CTA NECK    RIGHT CAROTID SYSTEM: Patentthree tandem ICA stents extending from the   ICA origin to the level of the horizontal petrous segment via a tortuous   hairpin turn in the distal cervical ICA. Normal contrast opacification   within and distal to the stent intracranially. Patent right CCA.    LEFT CAROTID SYSTEM: Normal in caliber without flow-limiting stenosis or   occlusion. Tortuous distal cervical ICA segment with hairpin loop.    VERTEBRAL SYSTEM:  Normal in course and caliber without flow-limiting   stenosis or occlusion. Origin of the vertebral arteries are unremarkable.    AORTIC ARCH: Origin of the great vessels are unremarkable.    IMPRESSION:    CT HEAD: Acute multifocal subarachnoid bleed in the left Sylvian fissure   extending into the left frontal sulci, inferior interhemispheric fissure,   and right parietal sulci. No associated mass effect.    Chronic right MCA territory infarction resulting in volume loss and 5 mm   rightward ipsilateral shift.    Tandem distal cervical and clinoid/supraclinoid ICA stents.    CTA BRAIN: Patent right clinoid/supraclinoid ICA stent extending into the   ICA terminus. Adjacent metallic device extending posterior to the ICA   terminus and proximal M1 segment of the right MCA may be related to   aneurysm coil embolization.Correlate with surgical history.    Left posterior communicating artery infundibulum.    CTA NECK: Patent three tandem right cervical ICA stents extending from   the ICA origin to the level of the horizontal petrous segment via a   tortuous hairpin turn in the distal cervical ICA.  
CC: 47 y old  Male who presents with a chief complaint of Trauma       HPI:  77 year old M with PMHx of CVA presented to ED, was found down by his work truck with a abrasion to the left parietal area with a subgaleal hematoma. Pt seen initially as Code trauma, followed up by neurosurgery as CT head revealed acute multifocal subarachnoid bleed in the left Sylvian fissure extending into left frontal sulci, inferior interhemispheric fissure and right parietal sulci. Neurology consulted for seizure and seizure meds    Pt reports he had a stroke a year and a half ago and was treated at [facility], had a multiple stents/webs place in the right internal and intracerebral carotid artery. He also started to have seizures after the stroke., was on ASA, Brilinta, Keppra, and amlodipine, he stopped taking all he medication some time ago because he didn't like the way they made him feel, especially Keppra that made him feel nasty.     On exam today, he had mild HA, no other complaints, he does not recall having had any more seizures in the interim since his prior stroke.  Yesterday, he recalls going to work, he did not feel well, went out to possibly rest in his van, has no recollection as to what happened after that.       PAST MEDICAL & SURGICAL HISTORY:  Stroke  s/p right MCA stent  Seizure      FAMILY HISTORY:  No relevant history       Social Hx:  Nonsmoker, no drug or alcohol use      MEDICATIONS  (STANDING):  sodium chloride 0.9%. 1000 milliLiter(s) (75 mL/Hr) IV Continuous <Continuous>       Allergies  No Known Allergies      ROS: Pertinent positives in HPI, all other ROS were reviewed and are negative.        Vital Signs Last 24 Hrs  T(C): 36.7 (10 May 2023 09:36), Max: 36.7 (10 May 2023 09:36)  T(F): 98.1 (10 May 2023 09:36), Max: 98.1 (10 May 2023 09:36)  HR: 59 (10 May 2023 11:00) (59 - 74)  BP: 137/89 (10 May 2023 11:00) (134/87 - 150/80)  BP(mean): 100 (10 May 2023 11:00) (94 - 110)  RR: 12 (10 May 2023 11:00) (10 - 15)  SpO2: 98% (10 May 2023 08:00) (93% - 98%)    Parameters below as of 10 May 2023 08:00  Patient On (Oxygen Delivery Method): room air      Gen exam:  Normocephalic, awake and alert.  HEENT: PERRLA, EOMI,   Neck: Supple.  Respiratory: Breath sounds are clear bilaterally  Cardiovascular: S1 and S2, regular  Extremities:  no edema  Vascular: Carotid Bruit - no  Musculoskeletal: no abnormal movements  Skin: No rashes      Neurological exam:  HF: A x O x 3. Appropriately interactive, normal affect. Speech fluent, No Aphasia or paraphasic errors. Naming /repetition intact   CN: LAWRENCE, EOMI, VFF, facial sensation normal, no NLFD, tongue midline, Palate moves equally, SCM equal bilaterally  Motor: No pronator drift, Strength 5/5 in all 4 ext, normal bulk and tone, no tremor.    Sens: Intact to light touch / PP    Reflexes: Symmetric and normal, downgoing toes b/l  Coord:  No FNFA, dysmetria, DEEP intact   Gait/Balance: Not tested    NIHSS: 0          Labs:   05-10    144  |  114<H>  |  12  ----------------------------<  95  3.9   |  21<L>  |  0.76    Ca    8.8      10 May 2023 05:59  Phos  3.8     05-10  Mg     2.4     05-10    TPro  7.6  /  Alb  4.2  /  TBili  0.5  /  DBili  x   /  AST  26  /  ALT  26  /  AlkPhos  106  05-09                        13.0   15.61 )-----------( 237      ( 10 May 2023 05:59 )             41.2       Radiology:  -< from: CT Angio Brain Stroke Protocol  w/ IV Cont (05.09.23 @ 19:17) >  IMPRESSION:    CT HEAD: Acute multifocal subarachnoid bleed in the left Sylvian fissure   extending into the left frontal sulci, inferior interhemispheric fissure,   and right parietal sulci. No associated mass effect.    Chronic right MCA territory infarction resulting in volume loss and 5 mm   rightward ipsilateral shift.    Tandem distal cervical and clinoid/supraclinoid ICA stents.    CTA BRAIN: Patent right clinoid/supraclinoid ICA stent extending into the   ICA terminus. Adjacent metallic device extending posterior to the ICA   terminus and proximal M1 segment of the right MCA may be related to   aneurysm coil embolization. Correlate with surgical history.    Left posterior communicating artery infundibulum.    CTA NECK: Patent three tandem right cervical ICA stents extending from   the ICA origin to the level of the horizontal petrous segment via a   tortuous hairpin turn in the distal cervical ICA.

## 2023-05-12 DIAGNOSIS — G93.89 OTHER SPECIFIED DISORDERS OF BRAIN: ICD-10-CM

## 2023-05-12 DIAGNOSIS — R55 SYNCOPE AND COLLAPSE: ICD-10-CM

## 2023-05-12 DIAGNOSIS — S01.01XA LACERATION WITHOUT FOREIGN BODY OF SCALP, INITIAL ENCOUNTER: ICD-10-CM
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
## 2023-05-12 DIAGNOSIS — Z86.73 PERSONAL HISTORY OF TRANSIENT ISCHEMIC ATTACK (TIA), AND CEREBRAL INFARCTION WITHOUT RESIDUAL DEFICITS: ICD-10-CM
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
## 2023-05-12 DIAGNOSIS — S06.6X9A TRAUMATIC SUBARACHNOID HEMORRHAGE WITH LOSS OF CONSCIOUSNESS OF UNSPECIFIED DURATION, INITIAL ENCOUNTER: ICD-10-CM

## 2023-05-12 DIAGNOSIS — R40.2412 GLASGOW COMA SCALE SCORE 13-15, AT ARRIVAL TO EMERGENCY DEPARTMENT: ICD-10-CM

## 2023-05-12 DIAGNOSIS — G40.909 EPILEPSY, UNSPECIFIED, NOT INTRACTABLE, WITHOUT STATUS EPILEPTICUS: ICD-10-CM
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
